# Patient Record
Sex: MALE | Race: WHITE | NOT HISPANIC OR LATINO | Employment: FULL TIME | URBAN - METROPOLITAN AREA
[De-identification: names, ages, dates, MRNs, and addresses within clinical notes are randomized per-mention and may not be internally consistent; named-entity substitution may affect disease eponyms.]

---

## 2018-06-12 ENCOUNTER — OFFICE VISIT (OUTPATIENT)
Dept: ENDOCRINOLOGY | Facility: MEDICAL CENTER | Age: 48
End: 2018-06-12
Payer: COMMERCIAL

## 2018-06-12 VITALS
DIASTOLIC BLOOD PRESSURE: 70 MMHG | OXYGEN SATURATION: 99 % | BODY MASS INDEX: 33.46 KG/M2 | SYSTOLIC BLOOD PRESSURE: 138 MMHG | HEART RATE: 76 BPM | HEIGHT: 71 IN | WEIGHT: 239 LBS

## 2018-06-12 DIAGNOSIS — Z79.4 ENCOUNTER FOR LONG-TERM (CURRENT) USE OF INSULIN (HCC): ICD-10-CM

## 2018-06-12 DIAGNOSIS — E11.00 UNCONTROLLED TYPE 2 DIABETES MELLITUS WITH HYPEROSMOLARITY WITHOUT COMA, WITHOUT LONG-TERM CURRENT USE OF INSULIN (HCC): ICD-10-CM

## 2018-06-12 DIAGNOSIS — E10.649 TYPE 1 DIABETES MELLITUS WITH HYPOGLYCEMIA UNAWARENESS (HCC): ICD-10-CM

## 2018-06-12 PROCEDURE — 99203 OFFICE O/P NEW LOW 30 MIN: CPT | Performed by: PHYSICIAN ASSISTANT

## 2018-06-12 NOTE — PROGRESS NOTES
New Patient Consult Note  Referred by: Pcp Pt States None    Reason for consult: Type 1 diabetes    HPI:  Patel Rowley is a 48 y.o. old patient who is seeing us today for diabetes care.  This is a pleasant patient with diabetes and I appreciate the opportunity to participate in the care of this patient.  This is a new patient with me today.    No labs were sent over with the referral    BG Diary:6/12/2018  In the AM:  Did not bring    Has been Diabetic since T1 36 years ago  Has a Glucagon pen at home: no    1. Uncontrolled type 1 diabetes mellitus with hyperosmolarity without coma, without long-term current use of insulin (HCC)    This is a new patient with me on 6/12/18  He is on:  1.  Tresiba 49 at night  2.  Humalog  Carb 1:15   Correction Factor   1:50 Above 100      ROS:   Constitutional: No change in weight , No fatigue, No night sweats.  HEENT: No Headache.  Eyes:  No blurred vision, No visual changes.  Cardiac: No chest pain, No palpitations.  Resp: No shortness of breath, No cough,   Gastro: No nausea or vomiting, No diarrhea.  Neuro: Denies numbness or tinging in bilateral feet or hands, and no loss of sensation.  Endo: No heat or cold intolerance.  : No polyuria, No polydipsia, No chronic UTI's.  Lower extremities: No lower leg edema bilateral.  All other systems were reviewed and were negative.    Past Medical History:  Patient Active Problem List    Diagnosis Date Noted   • Type 1 diabetes mellitus with hypoglycemia unawareness (HCC) 06/12/2018   • Encounter for long-term (current) use of insulin (Formerly [facility]) 06/12/2018       Past Surgical History:  History reviewed. No pertinent surgical history.    Allergies:  Patient has no known allergies.    Social History:  Social History     Social History   • Marital status: Unknown     Spouse name: N/A   • Number of children: N/A   • Years of education: N/A     Occupational History   • Not on file.     Social History Main Topics   • Smoking status: Never Smoker  "  • Smokeless tobacco: Never Used   • Alcohol use No   • Drug use: Yes     Frequency: 7.0 times per week     Types: Marijuana      Comment: \"A little every night\"   • Sexual activity: Not on file     Other Topics Concern   • Not on file     Social History Narrative   • No narrative on file       Family History:  Family History   Problem Relation Age of Onset   • Stroke Father    • Diabetes Maternal Grandfather    • Diabetes Paternal Grandfather        Medications:  No current outpatient prescriptions on file.      Physical Examination:   Vital signs: /70   Pulse 76   Ht 1.803 m (5' 11\")   Wt 108.4 kg (239 lb)   SpO2 99%   BMI 33.33 kg/m²   General: No distress, cooperative, well dressed and well nourished.   Eyes: No scleral icterus or discharge, No hyposphagma  ENMT: Normal on external inspection of nose, lips, No nasal drainage   Neck: No abnormal masses on inspection  Resp: Normal effort, Bilateral clear to auscultation, No wheezing, No rales  CVS: Regular rate and rhythm, S1 S2 normal, No murmur. No gallop  Extremities: No edema bilateral extremities  Neuro: Alert and oriented  Skin: No rash, No Ulcers  Psych: Normal mood and affect      Assessment and Plan:    1. Uncontrolled type 1 diabetes mellitus with hyperosmolarity without coma, without long-term current use of insulin (HCC)    He is on:  1.  Tresiba 49 at night  2.  Humalog  Carb 1:15   Correction Factor   1:50 Above 100  3.  Humphreys Ralph started today  4.  Ordered labs c-peptide and antibodies    The total time spent seeing this patient today face to face in consultation, and formulating an action plan for this visit was greater than 25 minutes. > Than 50% of this time was spent counseling, discussing problems documented above and below, coordinating care and answering questions by the physician assistant.  We developed a diabetes care plan for this patient today.      Return in about 1 month (around 7/12/2018).    Blood glucose log: Check BG " in the morning when wake up, before lunch or dinner and before bed.  So three times a day.  Always bring BG diary to the next office visit.     Thank you kindly for allowing me to participate in the diabetes care plan for this patient.    Jameson Regalado PA-C, BC-ADM  Board Certified - [employer] Diabetes [facility]  06/12/18    CC:   Pcp Pt States None

## 2018-06-13 NOTE — PATIENT INSTRUCTIONS
He is on:  1.  Tresiba 49 at night  2.  Humalog  Carb 1:15   Correction Factor   1:50 Above 100  3.  Petr Rosas started today

## 2018-06-27 ENCOUNTER — HOSPITAL ENCOUNTER (OUTPATIENT)
Dept: LAB | Facility: MEDICAL CENTER | Age: 48
End: 2018-06-27
Attending: PHYSICIAN ASSISTANT
Payer: COMMERCIAL

## 2018-06-27 DIAGNOSIS — Z79.4 ENCOUNTER FOR LONG-TERM (CURRENT) USE OF INSULIN (HCC): ICD-10-CM

## 2018-06-27 DIAGNOSIS — E10.649 TYPE 1 DIABETES MELLITUS WITH HYPOGLYCEMIA UNAWARENESS (HCC): ICD-10-CM

## 2018-06-27 PROCEDURE — 86337 INSULIN ANTIBODIES: CPT

## 2018-06-27 PROCEDURE — 36415 COLL VENOUS BLD VENIPUNCTURE: CPT

## 2018-06-27 PROCEDURE — 86341 ISLET CELL ANTIBODY: CPT | Mod: 91

## 2018-06-27 PROCEDURE — 84681 ASSAY OF C-PEPTIDE: CPT

## 2018-06-29 ENCOUNTER — OFFICE VISIT (OUTPATIENT)
Dept: ENDOCRINOLOGY | Facility: MEDICAL CENTER | Age: 48
End: 2018-06-29
Payer: COMMERCIAL

## 2018-06-29 VITALS
OXYGEN SATURATION: 98 % | DIASTOLIC BLOOD PRESSURE: 70 MMHG | WEIGHT: 237.6 LBS | SYSTOLIC BLOOD PRESSURE: 114 MMHG | BODY MASS INDEX: 33.26 KG/M2 | HEIGHT: 71 IN | HEART RATE: 73 BPM

## 2018-06-29 DIAGNOSIS — Z79.4 ENCOUNTER FOR LONG-TERM (CURRENT) USE OF INSULIN (HCC): ICD-10-CM

## 2018-06-29 DIAGNOSIS — E10.649 TYPE 1 DIABETES MELLITUS WITH HYPOGLYCEMIA UNAWARENESS (HCC): ICD-10-CM

## 2018-06-29 PROCEDURE — 99214 OFFICE O/P EST MOD 30 MIN: CPT | Performed by: PHYSICIAN ASSISTANT

## 2018-06-29 PROCEDURE — 95251 CONT GLUC MNTR ANALYSIS I&R: CPT | Performed by: PHYSICIAN ASSISTANT

## 2018-06-29 RX ORDER — FLASH GLUCOSE SENSOR
1 KIT MISCELLANEOUS
Qty: 3 EACH | Refills: 11 | Status: SHIPPED | OUTPATIENT
Start: 2018-06-29

## 2018-06-29 RX ORDER — ATORVASTATIN CALCIUM 20 MG/1
20 TABLET, FILM COATED ORAL
Refills: 3 | COMMUNITY
Start: 2018-06-23

## 2018-06-29 RX ORDER — INSULIN ASPART 100 [IU]/ML
INJECTION, SOLUTION INTRAVENOUS; SUBCUTANEOUS
Refills: 1 | COMMUNITY
Start: 2018-04-25

## 2018-06-29 RX ORDER — BLOOD-GLUCOSE METER
KIT MISCELLANEOUS
Refills: 11 | COMMUNITY
Start: 2018-06-13 | End: 2018-07-27

## 2018-06-29 RX ORDER — LANCETS 28 GAUGE
EACH MISCELLANEOUS
Refills: 1 | COMMUNITY
Start: 2018-05-04 | End: 2018-07-27

## 2018-06-29 NOTE — PROGRESS NOTES
Return to office Patient Consult Note  Referred by: Vic Diaz D.O.    Reason for consult: Diabetes Management Type 1    HPI:  Patel Rowley is a 48 y.o. old patient who is seeing us today for diabetes care.  This is a pleasant patient with diabetes and I appreciate the opportunity to participate in the care of this patient.    BG Diary:6/29/2018  In the AM:  See kg  Feels running 160's       1. Type 1 diabetes mellitus with hypoglycemia unawareness (HCC)  This is a new patient with me on 6/12/18  He is on:  1.  Tresiba 49 at night  2.  Humalog  Carb 1:15   Correction Factor   1:50 Above 100  3. Abbott Kg       2. Encounter for long-term (current) use of insulin (HCC)  Is on a high risk medication Insulin and we will continue to follow no hypoglycemic events since last visit      ROS:   Constitutional: No night sweats.  Eyes:  No visual changes.  Cardiac: No chest pain, No palpitations or racing heart rate.  Resp: No shortness of breath, No cough,   Gi: No Diarrhea    All other systems were reviewed and were/are negative.  The ROS was revised/revisited during this office visit from the patients first office visit with me on 6/12/18 Please review the full ROS during the first office visit.    Past Medical History:  Patient Active Problem List    Diagnosis Date Noted   • Type 1 diabetes mellitus with hypoglycemia unawareness (HCC) 06/12/2018   • Encounter for long-term (current) use of insulin (Formerly [facility]) 06/12/2018       Past Surgical History:  No past surgical history on file.    Allergies:  Patient has no known allergies.    Social History:  Social History     Social History   • Marital status: Unknown     Spouse name: N/A   • Number of children: N/A   • Years of education: N/A     Occupational History   • Not on file.     Social History Main Topics   • Smoking status: Never Smoker   • Smokeless tobacco: Never Used   • Alcohol use No   • Drug use: Yes     Frequency: 7.0 times per week     Types: Marijuana     "  Comment: \"A little every night\"   • Sexual activity: Not on file     Other Topics Concern   • Not on file     Social History Narrative   • No narrative on file       Family History:  Family History   Problem Relation Age of Onset   • Stroke Father    • Diabetes Maternal Grandfather    • Diabetes Paternal Grandfather        Medications:    Current Outpatient Prescriptions:   •  atorvastatin (LIPITOR) 20 MG Tab, Take 20 mg by mouth every day., Disp: , Rfl: 3  •  FREESTYLE LITE strip, 4 UNITS FOUR TIMES A DAY TEST YOUR BLOOD SUGAR 4 X A DAY, Disp: , Rfl: 11  •  NOVOLOG FLEXPEN 100 UNIT/ML Solution Pen-injector solution for injection, INJECT 10 UNITS BELOW THE SKIN THREE TIMES A DAY, Disp: , Rfl: 1  •  NOVOFINE 32G X 6 MM Misc, USE WITH INSULIN FOUR TIMES DAILY, Disp: , Rfl: 1  •  FREESTYLE LANCETS Misc, USE TO TEST BLOOD GLUCOSE 4 TIMES A DAY, Disp: , Rfl: 1  •  glucose blood (FREESTYLE LITE) strip, 1 Strip by Other route as needed., Disp: 100 Strip, Rfl: 6  •  Continuous Blood Gluc Sensor (FREESTYLE ANAMIKA SENSOR SYSTEM) Misc, 1 Applicator by Does not apply route every 10 days., Disp: 3 Each, Rfl: 11        Physical Examination:   Vital signs: /70   Pulse 73   Ht 1.803 m (5' 10.98\")   Wt 107.8 kg (237 lb 9.6 oz)   SpO2 98%   BMI 33.15 kg/m²   General: No distress, cooperative, well dressed and well nourished.   Eyes: No scleral icterus or discharge, No hyposphagma  ENMT: Normal on external inspection of nose, lips, No nasal drainage   Neck: No abnormal masses on inspection  Resp: Normal effort, Bilateral clear to auscultation, No wheezing, No rales  CVS: Regular rate and rhythm, S1 S2 normal, No murmur. No gallop  Extremities: No edema bilateral extremities  Neuro: Alert and oriented  Skin: No rash, No Ulcers  Psych: Normal mood and affect      Assessment and Plan:    1. Type 1 diabetes mellitus with hypoglycemia unawareness (HCC)  He is on:  1.  Tresiba 49 at night   2.  Humalog  Carb 1:12  Correction " Factor   1:50 Above 100  3. Abbott Ralph      2. Encounter for long-term (current) use of insulin (HCC)  Is on a high risk medication Insulin and we will continue to follow no hypoglycemic events since last visit    Return in about 1 month (around 7/29/2018).    Blood glucose log: Check BG in the morning when wake up, before lunch or dinner and before bed.  So three times a day.  Always bring BG diary to the next office visit.       Thank you kindly for allowing me to participate in the diabetes care plan for this patient.    Jameson Regalado PA-C, BC-ADM  Board Certified - [employer] [facility]  06/29/18    CC:   Vic Diaz D.O.

## 2018-06-29 NOTE — PATIENT INSTRUCTIONS
He is on:  1.  Tresiba 49 at night   2.  Humalog  Carb 1:12  Correction Factor   1:50 Above 100  3. Humphreys Ralph

## 2018-07-02 LAB — PANC ISLET CELL AB TITR SER: NORMAL {TITER}

## 2018-07-04 LAB
C PEPTIDE SERPL-MCNC: <0.1 NG/ML (ref 0.8–3.5)
GAD65 AB SER IA-ACNC: <5 IU/ML (ref 0–5)

## 2018-07-11 LAB — INSULIN HUMAN AB SER-ACNC: 7.4 U/ML (ref 0–0.4)

## 2018-07-27 ENCOUNTER — OFFICE VISIT (OUTPATIENT)
Dept: ENDOCRINOLOGY | Facility: MEDICAL CENTER | Age: 48
End: 2018-07-27
Payer: COMMERCIAL

## 2018-07-27 VITALS
HEIGHT: 71 IN | RESPIRATION RATE: 16 BRPM | BODY MASS INDEX: 33.18 KG/M2 | WEIGHT: 237 LBS | SYSTOLIC BLOOD PRESSURE: 124 MMHG | HEART RATE: 72 BPM | DIASTOLIC BLOOD PRESSURE: 70 MMHG | OXYGEN SATURATION: 97 %

## 2018-07-27 DIAGNOSIS — E10.649 TYPE 1 DIABETES MELLITUS WITH HYPOGLYCEMIA UNAWARENESS (HCC): ICD-10-CM

## 2018-07-27 DIAGNOSIS — Z79.4 ENCOUNTER FOR LONG-TERM (CURRENT) USE OF INSULIN (HCC): ICD-10-CM

## 2018-07-27 PROCEDURE — 99214 OFFICE O/P EST MOD 30 MIN: CPT | Performed by: PHYSICIAN ASSISTANT

## 2018-07-27 RX ORDER — FLASH GLUCOSE SENSOR
1 KIT MISCELLANEOUS
Qty: 3 EACH | Refills: 11 | Status: SHIPPED | OUTPATIENT
Start: 2018-07-27 | End: 2018-09-28 | Stop reason: SDUPTHER

## 2018-07-27 NOTE — PROGRESS NOTES
Return to office Patient Consult Note  Referred by: Vic Diaz D.O.    Reason for consult: Diabetes Management Type 1    HPI:  Patel Rowley is a 48 y.o. old patient who is seeing us today for diabetes care.  This is a pleasant patient with diabetes and I appreciate the opportunity to participate in the care of this patient.    6/27/18 Insulin Ab 7.4, c-peptide <0.1,     BG Diary:7/27/2018  In the AM: abbott Ralph      1. Encounter for long-term (current) use of insulin (HCC)  This is a new patient with me on 6/12/18  He is on:  1.  Tresiba 49 at night  2.  Humalog  Carb 1:12   Correction Factor   1:50 Above 100  3. Abbott Ralph    2. Type 1 diabetes mellitus with hypoglycemia unawareness (HCC)  Is on a high risk medication Insulin and we will continue to follow no hypoglycemic events since last visit        ROS:   Constitutional: No night sweats.  Eyes:  No visual changes.  Cardiac: No chest pain, No palpitations or racing heart rate.  Resp: No shortness of breath, No cough,   Gi: No Diarrhea    All other systems were reviewed and were/are negative.  The ROS was revised/revisited during this office visit from the patients first office visit with me on 6/12/18 Please review the full ROS during the first office visit.    Past Medical History:  Patient Active Problem List    Diagnosis Date Noted   • Type 1 diabetes mellitus with hypoglycemia unawareness (HCC) 06/12/2018   • Encounter for long-term (current) use of insulin (HCC) 06/12/2018       Past Surgical History:  No past surgical history on file.    Allergies:  Patient has no known allergies.    Social History:  Social History     Social History   • Marital status: Unknown     Spouse name: N/A   • Number of children: N/A   • Years of education: N/A     Occupational History   • Not on file.     Social History Main Topics   • Smoking status: Never Smoker   • Smokeless tobacco: Never Used   • Alcohol use No   • Drug use: Yes     Frequency: 7.0 times per week  "    Types: Marijuana      Comment: \"A little every night\"   • Sexual activity: Not on file     Other Topics Concern   • Not on file     Social History Narrative   • No narrative on file       Family History:  Family History   Problem Relation Age of Onset   • Stroke Father    • Diabetes Maternal Grandfather    • Diabetes Paternal Grandfather        Medications:    Current Outpatient Prescriptions:   •  Insulin Degludec (TRESIBA FLEXTOUCH) 100 UNIT/ML Solution Pen-injector, Inject  as instructed., Disp: , Rfl:   •  Continuous Blood Gluc Sensor (FREESTYLE ANAMIKA SENSOR SYSTEM) Misc, 1 Applicator by Does not apply route every 10 days., Disp: 3 Each, Rfl: 11  •  glucose blood (FREESTYLE PRECISION ELISEO TEST) strip, 1 Strip by Other route 2 times a day, with meals., Disp: 50 Strip, Rfl: 11  •  atorvastatin (LIPITOR) 20 MG Tab, Take 20 mg by mouth every day., Disp: , Rfl: 3  •  NOVOLOG FLEXPEN 100 UNIT/ML Solution Pen-injector solution for injection, INJECT 10 UNITS BELOW THE SKIN THREE TIMES A DAY, Disp: , Rfl: 1  •  NOVOFINE 32G X 6 MM Misc, USE WITH INSULIN FOUR TIMES DAILY, Disp: , Rfl: 1  •  glucose blood (FREESTYLE LITE) strip, 1 Strip by Other route as needed., Disp: 100 Strip, Rfl: 6  •  Continuous Blood Gluc Sensor (FREESTYLE ANAMIKA SENSOR SYSTEM) Misc, 1 Applicator by Does not apply route every 10 days., Disp: 3 Each, Rfl: 11        Physical Examination:   Vital signs: /70   Pulse 72   Resp 16   Ht 1.803 m (5' 10.98\")   Wt 107.5 kg (237 lb)   SpO2 97%   BMI 33.07 kg/m²   General: No distress, cooperative, well dressed and well nourished.   Eyes: No scleral icterus or discharge, No hyposphagma  ENMT: Normal on external inspection of nose, lips, No nasal drainage   Neck: No abnormal masses on inspection  Resp: Normal effort, Bilateral clear to auscultation, No wheezing, No rales  CVS: Regular rate and rhythm, S1 S2 normal, No murmur. No gallop  Extremities: No edema bilateral extremities  Neuro: Alert and " oriented  Skin: No rash, No Ulcers  Psych: Normal mood and affect      Assessment and Plan:    1. Encounter for long-term (current) use of insulin (HCC)  This is a new patient with me on 6/12/18  He is on:  1.  Tresiba 49 at night  2.  Humalog  Carb 1:12   Correction Factor   1:50 Above 100  3. Abbott Ralph    2. Type 1 diabetes mellitus with hypoglycemia unawareness (HCC)  Is on a high risk medication Insulin and we will continue to follow no hypoglycemic events since last visit      No Follow-up on file.    Blood glucose log: Check BG in the morning when wake up, before lunch or dinner and before bed.  So three times a day.  Always bring BG diary to the next office visit.     Thank you kindly for allowing me to participate in the diabetes care plan for this patient.    Jameson Regalado PA-C, BC-ADM  Board Certified - [facility] Diabetes [facility]  07/27/18    CC:   Vic Diaz D.O.

## 2018-09-26 ENCOUNTER — HOSPITAL ENCOUNTER (OUTPATIENT)
Dept: LAB | Facility: MEDICAL CENTER | Age: 48
End: 2018-09-26
Attending: PHYSICIAN ASSISTANT
Payer: COMMERCIAL

## 2018-09-26 DIAGNOSIS — Z79.4 ENCOUNTER FOR LONG-TERM (CURRENT) USE OF INSULIN (HCC): ICD-10-CM

## 2018-09-26 DIAGNOSIS — E10.649 TYPE 1 DIABETES MELLITUS WITH HYPOGLYCEMIA UNAWARENESS (HCC): ICD-10-CM

## 2018-09-26 PROCEDURE — 83036 HEMOGLOBIN GLYCOSYLATED A1C: CPT

## 2018-09-26 PROCEDURE — 36415 COLL VENOUS BLD VENIPUNCTURE: CPT

## 2018-09-26 PROCEDURE — 84443 ASSAY THYROID STIM HORMONE: CPT

## 2018-09-27 LAB
EST. AVERAGE GLUCOSE BLD GHB EST-MCNC: 171 MG/DL
HBA1C MFR BLD: 7.6 % (ref 0–5.6)
TSH SERPL DL<=0.005 MIU/L-ACNC: 1.33 UIU/ML (ref 0.38–5.33)

## 2018-09-28 ENCOUNTER — OFFICE VISIT (OUTPATIENT)
Dept: ENDOCRINOLOGY | Facility: MEDICAL CENTER | Age: 48
End: 2018-09-28
Payer: COMMERCIAL

## 2018-09-28 VITALS
HEIGHT: 71 IN | WEIGHT: 239 LBS | SYSTOLIC BLOOD PRESSURE: 110 MMHG | DIASTOLIC BLOOD PRESSURE: 60 MMHG | BODY MASS INDEX: 33.46 KG/M2 | HEART RATE: 85 BPM | RESPIRATION RATE: 16 BRPM | OXYGEN SATURATION: 95 %

## 2018-09-28 DIAGNOSIS — Z79.4 ENCOUNTER FOR LONG-TERM (CURRENT) USE OF INSULIN (HCC): ICD-10-CM

## 2018-09-28 DIAGNOSIS — E10.649 TYPE 1 DIABETES MELLITUS WITH HYPOGLYCEMIA UNAWARENESS (HCC): ICD-10-CM

## 2018-09-28 PROCEDURE — 99214 OFFICE O/P EST MOD 30 MIN: CPT | Performed by: PHYSICIAN ASSISTANT

## 2018-09-28 PROCEDURE — 95251 CONT GLUC MNTR ANALYSIS I&R: CPT | Performed by: PHYSICIAN ASSISTANT

## 2018-09-28 RX ORDER — FLASH GLUCOSE SENSOR
1 KIT MISCELLANEOUS
Qty: 3 EACH | Refills: 11 | Status: SHIPPED | OUTPATIENT
Start: 2018-09-28 | End: 2018-09-28 | Stop reason: SDUPTHER

## 2018-09-28 RX ORDER — FLASH GLUCOSE SENSOR
1 KIT MISCELLANEOUS
Qty: 3 EACH | Refills: 11 | Status: SHIPPED | OUTPATIENT
Start: 2018-09-28

## 2018-09-28 NOTE — PATIENT INSTRUCTIONS
He is on:  1.  Tresiba 49 at night  2.  Humalog  Carb 1:12   Correction Factor   1:40 Above 100  3. Humphreys Ralph

## 2018-09-28 NOTE — LETTER
[facility]  Vic Diaz D.O.  [address] [place] [address]  Fax: [phone]   Authorization for Release/Disclosure of   Protected Health Information   Name: PATEL ROWLEY : 1970 SSN: [id number]   Address: [address] [place] [address] [address]  [place] [address] Phone:    [phone] (home)    I authorize the entity listed below to release/disclose the PHI below to:   [facility]/ Jameson Regalado P.A.-C.   Provider or Entity Name:  [facility]   Address   City, State, Zip   Phone:      Fax:     Reason for request: continuity of care   Information to be released:    [  ] LAST COLONOSCOPY,  including any PATH REPORT and follow-up  [  ] LAST FIT/COLOGUARD RESULT [  ] LAST DEXA  [  ] LAST MAMMOGRAM  [  ] LAST PAP  [  ] LAST LABS [  ] RETINA EXAM REPORT  [  ] IMMUNIZATION RECORDS  [  ] Release all info      [  ] Check here and initial the line next to each item to release ALL health information INCLUDING  _____ Care and treatment for drug and / or alcohol abuse  _____ HIV testing, infection status, or AIDS  _____ Genetic Testing    DATES OF SERVICE OR TIME PERIOD TO BE DISCLOSED: _____________  I understand and acknowledge that:  * This Authorization may be revoked at any time by you in writing, except if your health information has already been used or disclosed.  * Your health information that will be used or disclosed as a result of you signing this authorization could be re-disclosed by the recipient. If this occurs, your re-disclosed health information may no longer be protected by State or Federal laws.  * You may refuse to sign this Authorization. Your refusal will not affect your ability to obtain treatment.  * This Authorization becomes effective upon signing and will  on (date) __________.      If no date is indicated, this Authorization will  one (1) year from the signature date.    Name: Patel Rowley    Signature:   Date:     2018       PLEASE FAX REQUESTED RECORDS  BACK TO: [phone]

## 2018-09-28 NOTE — PROGRESS NOTES
Return to office Patient Consult Note  Referred by: Vic Diaz D.O.    Reason for consult: Diabetes Management Type 1    HPI:  Patel Rowley is a 48 y.o. old patient who is seeing us today for diabetes care.  This is a pleasant patient with diabetes and I appreciate the opportunity to participate in the care of this patient.    9/28/18 HbA1c is 7.6  6/27/18 Insulin Ab 7.4, c-peptide <0.1,        Abbott Ralph    BG Diary:9/28/2018  In the AM:  See Abbott Ralph      1. Type 1 diabetes mellitus with hypoglycemia unawareness (HCC)  This is a new patient with me on 6/12/18  He is on:  1.  Tresiba 49 at night  2.  Humalog  Carb 1:12   Correction Factor   1:50 Above 100  3. Abbott Ralph    2. Encounter for long-term (current) use of insulin (HCC)  Is on a high risk medication Insulin and we will continue to follow no hypoglycemic events since last visit          ROS:   Constitutional: No night sweats.  Eyes:  No visual changes.  Cardiac: No chest pain, No palpitations or racing heart rate.  Resp: No shortness of breath, No cough,   Gi: No Diarrhea    All other systems were reviewed and were/are negative.  The ROS was revised/revisited during this office visit from the patients first office visit with me on 6/12/18 Please review the full ROS during the first office visit.    Past Medical History:  Patient Active Problem List    Diagnosis Date Noted   • Type 1 diabetes mellitus with hypoglycemia unawareness (HCC) 06/12/2018   • Encounter for long-term (current) use of insulin (HCC) 06/12/2018       Past Surgical History:  History reviewed. No pertinent surgical history.    Allergies:  Patient has no known allergies.    Social History:  Social History     Social History   • Marital status: Single     Spouse name: N/A   • Number of children: N/A   • Years of education: N/A     Occupational History   • Not on file.     Social History Main Topics   • Smoking status: Never Smoker   • Smokeless tobacco: Never Used  "  • Alcohol use No   • Drug use: Yes     Frequency: 7.0 times per week     Types: Marijuana      Comment: \"A little every night\"   • Sexual activity: Not on file     Other Topics Concern   • Not on file     Social History Narrative   • No narrative on file       Family History:  Family History   Problem Relation Age of Onset   • Stroke Father    • Diabetes Maternal Grandfather    • Diabetes Paternal Grandfather        Medications:    Current Outpatient Prescriptions:   •  Continuous Blood Gluc Sensor (FREESTYLE ANAMIKA SENSOR SYSTEM) Misc, 1 Applicator by Does not apply route every 10 days., Disp: 3 Each, Rfl: 11  •  Insulin Degludec (TRESIBA FLEXTOUCH) 100 UNIT/ML Solution Pen-injector, Inject  as instructed., Disp: , Rfl:   •  glucose blood (FREESTYLE PRECISION ELISEO TEST) strip, 1 Strip by Other route 2 times a day, with meals., Disp: 50 Strip, Rfl: 11  •  atorvastatin (LIPITOR) 20 MG Tab, Take 20 mg by mouth every day., Disp: , Rfl: 3  •  NOVOLOG FLEXPEN 100 UNIT/ML Solution Pen-injector solution for injection, INJECT 10 UNITS BELOW THE SKIN THREE TIMES A DAY, Disp: , Rfl: 1  •  NOVOFINE 32G X 6 MM Misc, USE WITH INSULIN FOUR TIMES DAILY, Disp: , Rfl: 1  •  glucose blood (FREESTYLE LITE) strip, 1 Strip by Other route as needed., Disp: 100 Strip, Rfl: 6  •  Continuous Blood Gluc Sensor (FREESTYLE ANAMIKA SENSOR SYSTEM) Misc, 1 Applicator by Does not apply route every 10 days., Disp: 3 Each, Rfl: 11        Physical Examination:   Vital signs: /60 (BP Location: Right arm, Patient Position: Sitting, BP Cuff Size: Adult)   Pulse 85   Resp 16   Ht 1.803 m (5' 11\")   Wt 108.4 kg (239 lb)   SpO2 95%   BMI 33.33 kg/m²   General: No distress, cooperative, well dressed and well nourished.   Eyes: No scleral icterus or discharge, No hyposphagma  ENMT: Normal on external inspection of nose, lips, No nasal drainage   Neck: No abnormal masses on inspection  Resp: Normal effort, Bilateral clear to auscultation, No " wheezing, No rales  CVS: Regular rate and rhythm, S1 S2 normal, No murmur. No gallop  Extremities: No edema bilateral extremities  Neuro: Alert and oriented  Skin: No rash, No Ulcers  Psych: Normal mood and affect      Assessment and Plan:    1. Type 1 diabetes mellitus with hypoglycemia unawareness (HCC)  He is on:  1.  Tresiba 49 at night  2.  Humalog  Carb 1:12   Correction Factor   1:40 Above 100  3. Abbott Ralph    2. Encounter for long-term (current) use of insulin (HCC)  Is on a high risk medication Insulin and we will continue to follow no hypoglycemic events since last visit    Return in about 1 month (around 10/28/2018).    Blood glucose log: Check BG in the morning when wake up, before lunch or dinner and before bed.  So three times a day.  Always bring BG diary to the next office visit.       Thank you kindly for allowing me to participate in the diabetes care plan for this patient.    Jameson Regalado PA-C, BC-ADM  Board Certified - Advanced Diabetes Management  09/28/18    CC:   Vic Diaz D.O.